# Patient Record
Sex: FEMALE | URBAN - METROPOLITAN AREA
[De-identification: names, ages, dates, MRNs, and addresses within clinical notes are randomized per-mention and may not be internally consistent; named-entity substitution may affect disease eponyms.]

---

## 2024-07-05 ENCOUNTER — CLAIMS CREATED FROM THE CLAIM WINDOW (OUTPATIENT)
Dept: URBAN - METROPOLITAN AREA MEDICAL CENTER 28 | Facility: MEDICAL CENTER | Age: 19
End: 2024-07-05

## 2024-07-05 DIAGNOSIS — B18.1 CARRIER OF HEPATITIS B: ICD-10-CM

## 2024-07-05 DIAGNOSIS — R14.0 ABDOMINAL BLOATING: ICD-10-CM

## 2024-07-05 DIAGNOSIS — R10.84 ABDOMINAL CRAMPING, GENERALIZED: ICD-10-CM

## 2024-07-05 DIAGNOSIS — K76.89 ABNORMAL FINDING ON LIVER FUNCTION: ICD-10-CM

## 2024-07-05 PROCEDURE — 99255 IP/OBS CONSLTJ NEW/EST HI 80: CPT | Performed by: INTERNAL MEDICINE

## 2024-07-08 ENCOUNTER — CLAIMS CREATED FROM THE CLAIM WINDOW (OUTPATIENT)
Dept: URBAN - METROPOLITAN AREA MEDICAL CENTER 28 | Facility: MEDICAL CENTER | Age: 19
End: 2024-07-08

## 2024-07-08 DIAGNOSIS — R63.4 ABNORMAL INTENTIONAL WEIGHT LOSS: ICD-10-CM

## 2024-07-08 DIAGNOSIS — K76.89 ABNORMAL FINDING ON LIVER FUNCTION: ICD-10-CM

## 2024-07-08 DIAGNOSIS — B19.10 CHRONIC HEPATITIS B: ICD-10-CM

## 2024-07-08 PROCEDURE — 99232 SBSQ HOSP IP/OBS MODERATE 35: CPT | Performed by: INTERNAL MEDICINE

## 2024-07-09 ENCOUNTER — CLAIMS CREATED FROM THE CLAIM WINDOW (OUTPATIENT)
Dept: URBAN - METROPOLITAN AREA MEDICAL CENTER 28 | Facility: MEDICAL CENTER | Age: 19
End: 2024-07-09

## 2024-07-09 DIAGNOSIS — K76.89 OTHER SPECIFIED DISEASES OF LIVER: ICD-10-CM

## 2024-07-09 DIAGNOSIS — R63.4 ABNORMAL INTENTIONAL WEIGHT LOSS: ICD-10-CM

## 2024-07-09 DIAGNOSIS — R76.8 HEPATITIS B ANTIBODY POSITIVE: ICD-10-CM

## 2024-07-09 PROCEDURE — 99232 SBSQ HOSP IP/OBS MODERATE 35: CPT | Performed by: INTERNAL MEDICINE

## 2024-07-10 ENCOUNTER — CLAIMS CREATED FROM THE CLAIM WINDOW (OUTPATIENT)
Dept: URBAN - METROPOLITAN AREA MEDICAL CENTER 28 | Facility: MEDICAL CENTER | Age: 19
End: 2024-07-10

## 2024-07-10 DIAGNOSIS — K76.89 OTHER SPECIFIED DISEASES OF LIVER: ICD-10-CM

## 2024-07-10 DIAGNOSIS — B19.10 CHRONIC HEPATITIS B: ICD-10-CM

## 2024-07-10 DIAGNOSIS — R63.4 ABNORMAL INTENTIONAL WEIGHT LOSS: ICD-10-CM

## 2024-07-10 PROCEDURE — 99232 SBSQ HOSP IP/OBS MODERATE 35: CPT | Performed by: INTERNAL MEDICINE

## 2024-07-13 ENCOUNTER — CLAIMS CREATED FROM THE CLAIM WINDOW (OUTPATIENT)
Dept: URBAN - METROPOLITAN AREA MEDICAL CENTER 28 | Facility: MEDICAL CENTER | Age: 19
End: 2024-07-13

## 2024-07-13 DIAGNOSIS — K76.89 OTHER SPECIFIED DISEASES OF LIVER: ICD-10-CM

## 2024-07-13 DIAGNOSIS — R93.3 ABN FINDINGS-GI TRACT: ICD-10-CM

## 2024-07-13 PROCEDURE — 99232 SBSQ HOSP IP/OBS MODERATE 35: CPT | Performed by: INTERNAL MEDICINE

## 2024-07-15 ENCOUNTER — CLAIMS CREATED FROM THE CLAIM WINDOW (OUTPATIENT)
Dept: URBAN - METROPOLITAN AREA MEDICAL CENTER 28 | Facility: MEDICAL CENTER | Age: 19
End: 2024-07-15

## 2024-07-15 DIAGNOSIS — K76.89 OTHER SPECIFIED DISEASES OF LIVER: ICD-10-CM

## 2024-07-15 PROCEDURE — 99232 SBSQ HOSP IP/OBS MODERATE 35: CPT | Performed by: INTERNAL MEDICINE

## 2024-07-16 ENCOUNTER — CLAIMS CREATED FROM THE CLAIM WINDOW (OUTPATIENT)
Dept: URBAN - METROPOLITAN AREA MEDICAL CENTER 28 | Facility: MEDICAL CENTER | Age: 19
End: 2024-07-16

## 2024-07-16 DIAGNOSIS — K76.89 OTHER SPECIFIED DISEASES OF LIVER: ICD-10-CM

## 2024-07-16 PROCEDURE — 99232 SBSQ HOSP IP/OBS MODERATE 35: CPT | Performed by: INTERNAL MEDICINE

## 2024-07-24 ENCOUNTER — TELEPHONE ENCOUNTER (OUTPATIENT)
Dept: URBAN - METROPOLITAN AREA CLINIC 96 | Facility: CLINIC | Age: 19
End: 2024-07-24

## 2024-07-24 ENCOUNTER — OFFICE VISIT (OUTPATIENT)
Dept: URBAN - METROPOLITAN AREA CLINIC 96 | Facility: CLINIC | Age: 19
End: 2024-07-24

## 2024-07-30 ENCOUNTER — TELEPHONE ENCOUNTER (OUTPATIENT)
Dept: URBAN - METROPOLITAN AREA CLINIC 86 | Facility: CLINIC | Age: 19
End: 2024-07-30

## 2024-07-30 ENCOUNTER — OFFICE VISIT (OUTPATIENT)
Dept: URBAN - METROPOLITAN AREA CLINIC 98 | Facility: CLINIC | Age: 19
End: 2024-07-30

## 2024-07-30 ENCOUNTER — OFFICE VISIT (OUTPATIENT)
Dept: URBAN - METROPOLITAN AREA CLINIC 86 | Facility: CLINIC | Age: 19
End: 2024-07-30

## 2024-07-30 VITALS — HEIGHT: 67 IN | WEIGHT: 97.8 LBS | BODY MASS INDEX: 15.35 KG/M2

## 2024-07-30 PROBLEM — 61977001: Status: ACTIVE | Noted: 2024-07-30

## 2024-07-30 PROBLEM — 109841003: Status: ACTIVE | Noted: 2024-07-30

## 2024-07-30 RX ORDER — ENTECAVIR 0.5 MG/1
1 TABLET ON AN EMPTY STOMACH TABLET, FILM COATED ORAL ONCE A DAY
Qty: 90 TABLET | Refills: 3 | OUTPATIENT
Start: 2024-07-30 | End: 2025-07-25

## 2024-07-30 NOTE — HPI-OTHER HISTORIES
CT A  7/2024   CT ANGIOGRAM ABDOMEN WITH CONTRAST CLINICAL HISTORY: Liver mass. Preembolization evaluation TECHNIQUE: CTA of the abdomen was performed after the administration of intravenous contrast intravenously. Images were obtained in the arterial phase. Multiplanar and MIP reformatting as well as 3-D volume rendering post processing was performed. This exam was performed utilizing automated exposure control as a radiation dose lowering technique. COMPARISON: MR abdomen July 9, 2024 FINDINGS: The lung bases are clear. There is a large heterogeneously enhancing low-density mass occupying the right hepatic lobe and compression upon the left hepatic lobe measuring 16.6 x 1.7 cm irregular central hypoattenuation which may represent a central scar. There is displacement of the intrahepatic vasculature. There is marked compression of the inferior vena cava with the intrahepatic portion of the inferior vena cava not opacified. There is marked enlargement of the azygos system which favors collateralization of venous blood flow return due to inferior vena cava obstruction. Moderate upper abdominal varices are noted. The portal vein, superior mesenteric vein splenic vein enhance normally. The abdominal aorta is normal in caliber demonstrating normal enhancement. The celiac trunk and distal branches appear to enhance normally. Normal in course and caliber. The superior mesenteric artery and inferior mesenteric artery and mesenteric arterial vessels enhance normally. The bilateral common iliac arteries are normal in caliber demonstrating normal enhancement. The stomach small bowel and colon within the upper abdomen are unremarkable. The pancreas, adrenals are unremarkable. The kidneys enhance normally. No hydronephrosis. No significant lymphadenopathy or ascites. No suspicious osseous lesions are identified. IMPRESSION: Large enhancing mass occupying the right hepatic lobe with occlusion of the intrahepatic inferior vena cava with collateralization of blood flow through the azygos system. The arterial vessels within the abdomen are otherwise unremarkable. . [employer]; [employer]; Symbol; \* \*\* \*  \*EXAM: MRI ABDOMEN W/ + W/O CONTRAST HISTORY/INDICATION: Liver mass, cancer vs. FNH vs. Adenoma. TECHNIQUE: Multiplanar, multisequence MR images of the abdomen were obtained on a 1.5 Martha magnet before and after the uneventful administration of 5 mL Eovist gadolinium contrast intravenously. COMPARISON: 07/04/2024 MRI, 07/04/2024 CT. FINDINGS: Key images are notated as (series #:image[s] #). - Lower thorax: Unremarkable. - Liver: Massive liver lesion centered in the right lobe measures 16.8 x 11.5 x 19.9 cm is characterized by a thick periphery of restricting diffusion/intermediate T2 signal with central stellate T2 bright tissue. The solid thickened periphery again exhibits patchy areas of arterial hyperenhancement which persists to washout, the central T2 bright stellate tissue showing progressive enhancement and retained contrast by the hepatobiliary phase. Portal vein is patent, but partially effaced and displaced medially. - Gallbladder and biliary tree: Due to mass effect by the right hepatic lesion, there is moderate intrahepatic biliary duct dilatation of the uninvolved left lobe and peripheral right lobe. Gallbladder is not well seen, probably decompressed subjacent to the mass (2:12?), With invasion by the mass not excluded. - Pancreas: Displaced into the left abdomen by the hepatic mass, but otherwise unremarkable. - Spleen: Mild splenomegaly, spanning 14.6 cm. - Adrenals: Unremarkable. - Kidneys/Ureters: Unremarkable. - Gastrointestinal: Unremarkable. - Lymph nodes: No abdominal lymphadenopathy. - Vasculature: Unremarkable. - Peritoneum/Retroperitoneum: No intraperitoneal free fluid. - Other soft tissues: Unremarkable. - Bones: No suspicious marrow signal abnormalities. IMPRESSION: 1.    Large right hepatic lobe mass with central scar redemonstrated, 19.9 cm. Most of the mass exhibits contrast washout, with the exception of the central scar, suggesting against FNH. While hepatic adenoma is not excluded, the characteristics of large size, washout, and central scar remain compatible with fibrolamellar hepatocellular carcinoma. Consider tissue sampling. 2.    Moderate intrahepatic ductal dilatation secondary to the mass, the gallbladder probably decompressed, displaced inferiorly by the mass. 3.    No metastatic disease identified in the abdomen. . [employer]; [employer]; Symbol; \* \*\* \*CT CHEST WITH CONTRAST CLINICAL HISTORY: Cough. No liver mass. TECHNIQUE: An enhanced CT scan of the chest is obtained from the lung apices to the lung bases after the administration of 60 mL of Omnipaque 350. Coronal and sagittal reformatted images are also submitted. Study performed utilizing automated dose control COMPARISON: CT of the abdomen and pelvis performed July 4, 2024. FINDINGS: Unremarkable thyroid gland. No mediastinal, hilar or axillary lymphadenopathy. The heart is not enlarged. Normal caliber of the ascending aorta. No pericardial or pleural effusion. Redemonstration of the large partially imaged heterogeneous mass centered within the right hepatic lobe. Background of hepatomegaly and diffuse fatty infiltration noted. Spleen is not enlarged. Patent central airway. No acute pneumonic infiltrates or pulmonary nodules. Bony thorax is intact. IMPRESSION: 1. Redemonstration of the large partially imaged heterogeneous mass centered within the right hepatic lobe. Background of hepatomegaly and fatty infiltration. Appearance is highly suspicious for hepatocellular carcinoma. MR of the liver without and with contrast is advised for further evaluation. Please see dedicated report of the CT of the abdomen performed same day. 2. No acute pneumonic infiltrates or discrete pulmonary nodules. 3. No mediastinal, hilar or axillary lymphadenopathy. CT CHEST WITH CONTRAST CLINICAL HISTORY: Cough. No liver mass. TECHNIQUE: An enhanced CT scan of the chest is obtained from the lung apices to the lung bases after the administration of 60 mL of Omnipaque 350. Coronal and sagittal reformatted images are also submitted. Study performed utilizing automated dose control COMPARISON: CT of the abdomen and pelvis performed July 4, 2024. FINDINGS: Unremarkable thyroid gland. No mediastinal, hilar or axillary lymphadenopathy. The heart is not enlarged. Normal caliber of the ascending aorta. No pericardial or pleural effusion. Redemonstration of the large partially imaged heterogeneous mass centered within the right hepatic lobe. Background of hepatomegaly and diffuse fatty infiltration noted. Spleen is not enlarged. Patent central airway. No acute pneumonic infiltrates or pulmonary nodules. Bony thorax is intact. IMPRESSION: 1. Redemonstration of the large partially imaged heterogeneous mass centered within the right hepatic lobe. Background of hepatomegaly and fatty infiltration. Appearance is highly suspicious for hepatocellular carcinoma. MR of the liver without and with contrast is advised for further evaluation. Please see dedicated report of the CT of the abdomen performed same day. 2. No acute pneumonic infiltrates or discrete pulmonary nodules. 3. No mediastinal, hilar or axillary lymphadenopathy.

## 2024-07-30 NOTE — HPI-TODAY'S VISIT:
Pt called and advised [facility] is where did work up and wants to follow in that area. Dr Clemens prior had case discussed with her and pt wants to follow there. Asked Galina to help get appt with FAITH there and with Dr Clemens post to allow for follow up.

## 2024-07-30 NOTE — HPI-TODAY'S VISIT:
Patient is a 18-year-old female who was apparently admitted to [facility] under the care of the hospital medicine team and was recently diagnosed as having suspected large unresectable HCC vs fibrolamellar malignancy and chronic hepatitis B.    A copy the note will be sent to the referring providers.    Patient records were able to be obtained today from [facility] as we initially had received no records otherwise and she advised us to look for records there.  We saw on July 4, 2024 she had a CT scan done for right upper quadrant pain and abnormal bleeding she was noted to have a large heterogeneous mass centered in the right lobe measuring 13.7 x 14.4 x 17.6 cm with the gallbladder present.  Portal vein, splenic vein, and superior mesenteric vein appear patent with no worrisome lesions seen in the spleen, pancreas or adrenal glands or kidneys.  No hydronephrosis was seen.  No bowel obstruction seen no free air.  Bladder contained fluid.  Uterus was present.  No adnexal mass seen.  No findings of acute appendicitis.  Abdominal aortic aneurysm seen.  No aggressive osseous lesion.  This large mass was found in the right lobe and was highly suspicious for fibrolamellar carcinoma per the radiologist.    We saw progress note dated July 15 the day before her discharge which was from July 4 of July 16 that she was being seen.  Labs then showed a WBC of 5.9 hemoglobin 9.2 platelet count 135 MCV 87.2 sodium 133 potassium 4.0 chloride 104 CO2 22 BUN of 9 creatinine 0.3 calcium 9.3 albumin 4.0 bilirubin 1.2 alk phos 610  .  Prior labs from July 14 showed alk phos 690 bilirubin 1.0  .  HIV was negative.  They had her on Lovenox 40 mg every day, Marinol 2.5 mg twice daily, Pepcid 20 mg twice daily.  She has Zofran as needed and tramadol as needed and Benadryl as needed.    They felt that she had been noted to have about 15 pounds of weight loss as well as itching and trouble sleeping.  She was found to be hep B surface antigen positive and hep B DNA quant was positive during admit.  They had her seen by oncology and they wanted to give her possible immunotherapy.  Her case was discussed with IR per notesd and they were awaiting financial approval for Y90 as well.  She was seen by GI with Dr. Clemens and was recommended for lifelong Entecavir as well. Did not see her note in info pulled for me.   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  Is that it was not typical for fibrolamellar variant in the elevated AFP and positive hep B also were not typical features as normal features of HCC. They also mentioned that testing for PRK RYLAN gene rearrangements if indicted. Tumor was moderately differentiated.  It was reviewed also by Dr. Jaimes who concurred.    There is a note from oncology from July 15 from Dr. Vijay Steel dimensions that that a prolonged discussion with the family regarding her new diagnosis and that she was unresectable and that they were considering her for bevacizumab and atezolizumab combination and a possible 30% response rate.    Other labs that we saw were basically lab summaries showing that on July 3 she had an alk phos of 758   bilirubin 1.1 WC 5.9 hemoglobin 12.3 plate count 183 MCV was 85.4 INR was 1.1.  Acetaminophen level was less than 2 salicylate level less than 3 hep A IgM negative hep B core total was positive, hep B surface antibody negative hep B surface antigen was positive hep C antibody was negative AFP was 15,377 with WBC on July 5 4.6 hemoglobin 10.6 plate count 142 MCV 84.6.    July 12 CT angio showed large enhancing mass in the right lobe with occlusion of the intrahepatic inferior vena cava with colorization of blood flow through the azygous system.  Your vessels were unremarkable.  July 10 ultrasound guided biopsy was done.  July 9 MRI eovist showed large masslike liver lesion in the right lobe measuring 16.8 x 11.5 x 19.9 cm with a thick periphery restricted diffusion/intermediate T2 signal with central stellate T2 bright tissue.  There was some areas of patchy arterial enhancement which persist to washout and some areas of progressive stellate tissue showing progressive enhancement and retained contrast.  Patent biliary phase.  Portal vein was patent partially effaced and displaced medially.  Due to mass effect by the right hepatic lesion there was moderate intrahepatic bile duct dilation seen of the uninvolved left lobe and peripheral right lobe.  Gallbladder was not well-seen probably decompressed some adjacent to the mass.  With invasion by the mass not excluded.  Pancreas appeared displaced in the abdomen by the mass.  Spleen was mildly enlarged at 14.6 cm.  Adrenals are unremarkable.  Kidneys and ureters unremarkable.  No intraperitoneal free fluid.  Over the large mass was 19.9 cm with exemptions central scar suggesting FNH hepatic, not excluded and the characteristics of the large size washout and central scar still remain compatible with fibrolamellar vital cell carcinoma consider tissue sampling.  Moderate intrahepatic duct dilation was seen secondary to the mass in the probable gallbladder decompressed and displaced inferiorly by the mass.  No metastatic disease seen in the abdomen.  Earlier MRI done July 4 showed very large mostly right-sided hepatic mass measuring up to 17 cm and it was most typical of a focal nodular aplasia although it was somewhat atypical features in size and heterogeneous central activity.  Adenoma was possibility that recommended eovist  exam.  more [facility] records seen that includes a gastroenterology progress note from Dr. Lal from July 8 for this patient was seen and features concerning for cancer and hep B was pending but the B surface antigen and positive AFP was up.  Initially were holding off on liver biopsy to cut avoid seeding.  Patient was not started on medications for the hep C.  aFP was 15,377.  She lost about 15 pounds in a month and a half.  Hep B surface antigen was positive.  The [facility] gi team said they plan for her to follow-up with Dr. Clemens as an outpatient.   She was seen also by Dr. Jeffery Tony on July 18 the lesion they felt was treatable by multi modality therapy.  They felt she was anatomically unresectable.  Called pt to see if she wants to follow with Dr Clemens and team closer to there as now saw records and she says wants to as all care will be via [facility]. We had staff get her in with Dr Clemens's [place] and then with Dr Clemens herself to follow the hep b issues while oncology assesses tx.  Pt thanked us for helping to get this setup.

## 2024-07-30 NOTE — HPI-TODAY'S VISIT:
here to follow up after NS stay 7/4 - 7/16  Giles Norris (sister and brother in law) with her today  Video  used  has HCC just diagnosed on liver biopsy as well as HBV  she has declined chemotherapy

## 2024-08-14 ENCOUNTER — OFFICE VISIT (OUTPATIENT)
Dept: URBAN - METROPOLITAN AREA CLINIC 98 | Facility: CLINIC | Age: 19
End: 2024-08-14

## 2024-08-14 RX ORDER — ENTECAVIR 0.5 MG/1
1 TABLET ON AN EMPTY STOMACH TABLET, FILM COATED ORAL ONCE A DAY
Qty: 90 TABLET | Refills: 3 | Status: ACTIVE | COMMUNITY
Start: 2024-07-30 | End: 2025-07-25

## 2024-08-14 NOTE — HPI-TODAY'S VISIT:
7/30/2024- Dr. Clemens here to follow up after NS stay 7/4 - 7/16  Giles Norris (sister and brother in law) with her today  Video  used  has HCC just diagnosed on liver biopsy as well as HBV  she has declined chemotherapy citing low rate of success  She is taking Entecavir  She would not want further treatment at this time   8/14/24- Jessenia Sethi,NP - 17 yo female here to  - Dx. with liver cancer during hospital stay early July after liver bx. - Hx. chronic HBV infection and HCC is not resectable. He/Onc- recommended systemic treatment with possible locoregional therapy. Patient aware systemic treatment will not cure HCC but could stabilize - Patient declined to proceed with chemotherapy and treatment on 7/30/24 with the understanding HHC will progress with liver decompensation and death. - Dr. Clemens referred patient to discuss further with Dr. Ochoa - Wanted to continue HBV treatment with

## 2024-08-14 NOTE — HPI-OTHER HISTORIES
CT A  7/2024   CT ANGIOGRAM ABDOMEN WITH CONTRAST CLINICAL HISTORY: Liver mass. Preembolization evaluation TECHNIQUE: CTA of the abdomen was performed after the administration of intravenous contrast intravenously. Images were obtained in the arterial phase. Multiplanar and MIP reformatting as well as 3-D volume rendering post processing was performed. This exam was performed utilizing automated exposure control as a radiation dose lowering technique. COMPARISON: MR abdomen July 9, 2024 FINDINGS: The lung bases are clear. There is a large heterogeneously enhancing low-density mass occupying the right hepatic lobe and compression upon the left hepatic lobe measuring 16.6 x 1.7 cm irregular central hypoattenuation which may represent a central scar. There is displacement of the intrahepatic vasculature. There is marked compression of the inferior vena cava with the intrahepatic portion of the inferior vena cava not opacified. There is marked enlargement of the azygos system which favors collateralization of venous blood flow return due to inferior vena cava obstruction. Moderate upper abdominal varices are noted. The portal vein, superior mesenteric vein splenic vein enhance normally. The abdominal aorta is normal in caliber demonstrating normal enhancement. The celiac trunk and distal branches appear to enhance normally. Normal in course and caliber. The superior mesenteric artery and inferior mesenteric artery and mesenteric arterial vessels enhance normally. The bilateral common iliac arteries are normal in caliber demonstrating normal enhancement. The stomach small bowel and colon within the upper abdomen are unremarkable. The pancreas, adrenals are unremarkable. The kidneys enhance normally. No hydronephrosis. No significant lymphadenopathy or ascites. No suspicious osseous lesions are identified. IMPRESSION: Large enhancing mass occupying the right hepatic lobe with occlusion of the intrahepatic inferior vena cava with collateralization of blood flow through the azygos system. The arterial vessels within the abdomen are otherwise unremarkable. . [employer]; [employer] [place] [employer]; Symbol; \* \*\* \*  \*EXAM: MRI ABDOMEN W/ + W/O CONTRAST HISTORY/INDICATION: Liver mass, cancer vs. FNH vs. Adenoma. TECHNIQUE: Multiplanar, multisequence MR images of the abdomen were obtained on a 1.5 Martha magnet before and after the uneventful administration of 5 mL Eovist gadolinium contrast intravenously. COMPARISON: 07/04/2024 MRI, 07/04/2024 CT. FINDINGS: Key images are notated as (series #:image[s] #). - Lower thorax: Unremarkable. - Liver: Massive liver lesion centered in the right lobe measures 16.8 x 11.5 x 19.9 cm is characterized by a thick periphery of restricting diffusion/intermediate T2 signal with central stellate T2 bright tissue. The solid thickened periphery again exhibits patchy areas of arterial hyperenhancement which persists to washout, the central T2 bright stellate tissue showing progressive enhancement and retained contrast by the hepatobiliary phase. Portal vein is patent, but partially effaced and displaced medially. - Gallbladder and biliary tree: Due to mass effect by the right hepatic lesion, there is moderate intrahepatic biliary duct dilatation of the uninvolved left lobe and peripheral right lobe. Gallbladder is not well seen, probably decompressed subjacent to the mass (2:12?), With invasion by the mass not excluded. - Pancreas: Displaced into the left abdomen by the hepatic mass, but otherwise unremarkable. - Spleen: Mild splenomegaly, spanning 14.6 cm. - Adrenals: Unremarkable. - Kidneys/Ureters: Unremarkable. - Gastrointestinal: Unremarkable. - Lymph nodes: No abdominal lymphadenopathy. - Vasculature: Unremarkable. - Peritoneum/Retroperitoneum: No intraperitoneal free fluid. - Other soft tissues: Unremarkable. - Bones: No suspicious marrow signal abnormalities. IMPRESSION: 1.    Large right hepatic lobe mass with central scar redemonstrated, 19.9 cm. Most of the mass exhibits contrast washout, with the exception of the central scar, suggesting against FNH. While hepatic adenoma is not excluded, the characteristics of large size, washout, and central scar remain compatible with fibrolamellar hepatocellular carcinoma. Consider tissue sampling. 2.    Moderate intrahepatic ductal dilatation secondary to the mass, the gallbladder probably decompressed, displaced inferiorly by the mass. 3.    No metastatic disease identified in the abdomen. . [employer]; [employer] [place]; Symbol; \* \*\* \*CT CHEST WITH CONTRAST CLINICAL HISTORY: Cough. No liver mass. TECHNIQUE: An enhanced CT scan of the chest is obtained from the lung apices to the lung bases after the administration of 60 mL of Omnipaque 350. Coronal and sagittal reformatted images are also submitted. Study performed utilizing automated dose control COMPARISON: CT of the abdomen and pelvis performed July 4, 2024. FINDINGS: Unremarkable thyroid gland. No mediastinal, hilar or axillary lymphadenopathy. The heart is not enlarged. Normal caliber of the ascending aorta. No pericardial or pleural effusion. Redemonstration of the large partially imaged heterogeneous mass centered within the right hepatic lobe. Background of hepatomegaly and diffuse fatty infiltration noted. Spleen is not enlarged. Patent central airway. No acute pneumonic infiltrates or pulmonary nodules. Bony thorax is intact. IMPRESSION: 1. Redemonstration of the large partially imaged heterogeneous mass centered within the right hepatic lobe. Background of hepatomegaly and fatty infiltration. Appearance is highly suspicious for hepatocellular carcinoma. MR of the liver without and with contrast is advised for further evaluation. Please see dedicated report of the CT of the abdomen performed same day. 2. No acute pneumonic infiltrates or discrete pulmonary nodules. 3. No mediastinal, hilar or axillary lymphadenopathy. CT CHEST WITH CONTRAST CLINICAL HISTORY: Cough. No liver mass. TECHNIQUE: An enhanced CT scan of the chest is obtained from the lung apices to the lung bases after the administration of 60 mL of Omnipaque 350. Coronal and sagittal reformatted images are also submitted. Study performed utilizing automated dose control COMPARISON: CT of the abdomen and pelvis performed July 4, 2024. FINDINGS: Unremarkable thyroid gland. No mediastinal, hilar or axillary lymphadenopathy. The heart is not enlarged. Normal caliber of the ascending aorta. No pericardial or pleural effusion. Redemonstration of the large partially imaged heterogeneous mass centered within the right hepatic lobe. Background of hepatomegaly and diffuse fatty infiltration noted. Spleen is not enlarged. Patent central airway. No acute pneumonic infiltrates or pulmonary nodules. Bony thorax is intact. IMPRESSION: 1. Redemonstration of the large partially imaged heterogeneous mass centered within the right hepatic lobe. Background of hepatomegaly and fatty infiltration. Appearance is highly suspicious for hepatocellular carcinoma. MR of the liver without and with contrast is advised for further evaluation. Please see dedicated report of the CT of the abdomen performed same day. 2. No acute pneumonic infiltrates or discrete pulmonary nodules. 3. No mediastinal, hilar or axillary lymphadenopathy.

## 2024-08-30 ENCOUNTER — OFFICE VISIT (OUTPATIENT)
Dept: URBAN - METROPOLITAN AREA CLINIC 98 | Facility: CLINIC | Age: 19
End: 2024-08-30

## 2024-08-30 ENCOUNTER — TELEPHONE ENCOUNTER (OUTPATIENT)
Dept: URBAN - METROPOLITAN AREA CLINIC 3 | Facility: CLINIC | Age: 19
End: 2024-08-30

## 2024-08-30 ENCOUNTER — DASHBOARD ENCOUNTERS (OUTPATIENT)
Age: 19
End: 2024-08-30

## 2024-08-30 VITALS
BODY MASS INDEX: 17.66 KG/M2 | WEIGHT: 96 LBS | SYSTOLIC BLOOD PRESSURE: 130 MMHG | TEMPERATURE: 98.1 F | HEART RATE: 80 BPM | DIASTOLIC BLOOD PRESSURE: 70 MMHG | HEIGHT: 62 IN

## 2024-08-30 RX ORDER — ENTECAVIR 0.5 MG/1
1 TABLET ON AN EMPTY STOMACH TABLET, FILM COATED ORAL ONCE A DAY
Qty: 90 TABLET | Refills: 3
Start: 2024-07-30 | End: 2025-08-25

## 2024-08-30 RX ORDER — ENTECAVIR 0.5 MG/1
1 TABLET ON AN EMPTY STOMACH TABLET, FILM COATED ORAL ONCE A DAY
Qty: 90 TABLET | Refills: 3 | Status: ON HOLD | COMMUNITY
Start: 2024-07-30 | End: 2025-07-25

## 2024-09-20 ENCOUNTER — OFFICE VISIT (OUTPATIENT)
Dept: URBAN - METROPOLITAN AREA CLINIC 98 | Facility: CLINIC | Age: 19
End: 2024-09-20

## 2024-09-20 RX ORDER — ENTECAVIR 0.5 MG/1
1 TABLET ON AN EMPTY STOMACH TABLET, FILM COATED ORAL ONCE A DAY
Qty: 90 TABLET | Refills: 3 | OUTPATIENT

## 2024-09-20 RX ORDER — ENTECAVIR 0.5 MG/1
1 TABLET ON AN EMPTY STOMACH TABLET, FILM COATED ORAL ONCE A DAY
Qty: 90 TABLET | Refills: 3 | Status: ACTIVE | COMMUNITY
Start: 2024-07-30 | End: 2025-08-25

## 2025-02-11 ENCOUNTER — TELEPHONE ENCOUNTER (OUTPATIENT)
Dept: URBAN - METROPOLITAN AREA CLINIC 98 | Facility: CLINIC | Age: 20
End: 2025-02-11

## 2025-02-11 RX ORDER — ENTECAVIR 0.5 MG/1
1 TABLET ON AN EMPTY STOMACH TABLET, FILM COATED ORAL ONCE A DAY
Qty: 90 TABLET | Refills: 3
End: 2026-02-06

## 2025-07-01 ENCOUNTER — CLAIMS CREATED FROM THE CLAIM WINDOW (OUTPATIENT)
Dept: URBAN - METROPOLITAN AREA MEDICAL CENTER 28 | Facility: MEDICAL CENTER | Age: 20
End: 2025-07-01
Payer: COMMERCIAL

## 2025-07-01 DIAGNOSIS — K31.89 OTHER DISEASES OF STOMACH AND DUODENUM: ICD-10-CM

## 2025-07-01 DIAGNOSIS — D50.9 ANEMIA: ICD-10-CM

## 2025-07-01 PROCEDURE — 43239 EGD BIOPSY SINGLE/MULTIPLE: CPT | Performed by: INTERNAL MEDICINE

## 2025-07-01 PROCEDURE — 43235 EGD DIAGNOSTIC BRUSH WASH: CPT | Performed by: INTERNAL MEDICINE

## 2025-08-24 ENCOUNTER — CLAIMS CREATED FROM THE CLAIM WINDOW (OUTPATIENT)
Dept: URBAN - METROPOLITAN AREA MEDICAL CENTER 28 | Facility: MEDICAL CENTER | Age: 20
End: 2025-08-24

## 2025-08-24 PROCEDURE — 99255 IP/OBS CONSLTJ NEW/EST HI 80: CPT | Performed by: INTERNAL MEDICINE

## 2025-08-25 ENCOUNTER — CLAIMS CREATED FROM THE CLAIM WINDOW (OUTPATIENT)
Dept: URBAN - METROPOLITAN AREA MEDICAL CENTER 28 | Facility: MEDICAL CENTER | Age: 20
End: 2025-08-25

## 2025-08-25 PROCEDURE — 99232 SBSQ HOSP IP/OBS MODERATE 35: CPT

## 2025-08-26 ENCOUNTER — CLAIMS CREATED FROM THE CLAIM WINDOW (OUTPATIENT)
Dept: URBAN - METROPOLITAN AREA MEDICAL CENTER 28 | Facility: MEDICAL CENTER | Age: 20
End: 2025-08-26

## 2025-08-26 PROCEDURE — 99232 SBSQ HOSP IP/OBS MODERATE 35: CPT

## 2025-08-27 ENCOUNTER — CLAIMS CREATED FROM THE CLAIM WINDOW (OUTPATIENT)
Dept: URBAN - METROPOLITAN AREA MEDICAL CENTER 28 | Facility: MEDICAL CENTER | Age: 20
End: 2025-08-27

## 2025-08-27 PROCEDURE — 99232 SBSQ HOSP IP/OBS MODERATE 35: CPT

## 2025-08-28 ENCOUNTER — CLAIMS CREATED FROM THE CLAIM WINDOW (OUTPATIENT)
Dept: URBAN - METROPOLITAN AREA MEDICAL CENTER 28 | Facility: MEDICAL CENTER | Age: 20
End: 2025-08-28

## 2025-08-28 PROCEDURE — 99232 SBSQ HOSP IP/OBS MODERATE 35: CPT
